# Patient Record
Sex: MALE | Race: AMERICAN INDIAN OR ALASKA NATIVE | NOT HISPANIC OR LATINO | Employment: FULL TIME | URBAN - METROPOLITAN AREA
[De-identification: names, ages, dates, MRNs, and addresses within clinical notes are randomized per-mention and may not be internally consistent; named-entity substitution may affect disease eponyms.]

---

## 2023-06-12 ENCOUNTER — HOSPITAL ENCOUNTER (OUTPATIENT)
Dept: DATA CONVERSION | Facility: HOSPITAL | Age: 33
End: 2023-06-12
Attending: STUDENT IN AN ORGANIZED HEALTH CARE EDUCATION/TRAINING PROGRAM | Admitting: STUDENT IN AN ORGANIZED HEALTH CARE EDUCATION/TRAINING PROGRAM

## 2023-06-12 DIAGNOSIS — S86.012A STRAIN OF LEFT ACHILLES TENDON, INITIAL ENCOUNTER: ICD-10-CM

## 2023-06-12 DIAGNOSIS — Z79.82 LONG TERM (CURRENT) USE OF ASPIRIN: ICD-10-CM

## 2023-09-07 VITALS — WEIGHT: 141.31 LBS | HEIGHT: 61 IN | BODY MASS INDEX: 26.68 KG/M2

## 2023-09-30 NOTE — H&P
History & Physical Reviewed:   I have reviewed the History and Physical dated:  08-Jun-2023   History and Physical reviewed and relevant findings noted. Patient examined to review pertinent physical  findings.: No significant changes   Home Medications Reviewed: no changes noted   Allergies Reviewed: no changes noted       ERAS (Enhanced Recovery After Surgery):  ·  ERAS Patient: no     Consent:   COVID-19 Consent:  ·  COVID-19 Risk Consent Surgeon has reviewed key risks related to the risk of magdiel COVID-19 and if they contract COVID-19 what the risks are.       Electronic Signatures:  Arian Mackay)  (Signed 12-Jun-2023 07:06)   Authored: History & Physical Reviewed, ERAS, Consent,  Note Completion      Last Updated: 12-Jun-2023 07:06 by Arian Mackay)

## 2023-10-01 PROBLEM — R26.2 DIFFICULTY WALKING: Status: ACTIVE | Noted: 2023-10-01

## 2023-10-01 PROBLEM — F32.A ANXIETY AND DEPRESSION: Status: ACTIVE | Noted: 2023-10-01

## 2023-10-01 PROBLEM — F41.9 ANXIETY AND DEPRESSION: Status: ACTIVE | Noted: 2023-10-01

## 2023-10-01 PROBLEM — S86.012A RUPTURE OF LEFT ACHILLES TENDON: Status: ACTIVE | Noted: 2023-10-01

## 2023-10-01 PROBLEM — S86.012D ACHILLES RUPTURE, LEFT, SUBSEQUENT ENCOUNTER: Status: ACTIVE | Noted: 2023-10-01

## 2023-10-01 PROBLEM — J45.909 ASTHMA (HHS-HCC): Status: ACTIVE | Noted: 2023-10-01

## 2023-10-01 PROBLEM — M25.672 STIFFNESS OF LEFT ANKLE, NOT ELSEWHERE CLASSIFIED: Status: ACTIVE | Noted: 2023-10-01

## 2023-10-01 RX ORDER — FLUTICASONE PROPIONATE 110 UG/1
1 AEROSOL, METERED RESPIRATORY (INHALATION) 2 TIMES DAILY
COMMUNITY
Start: 2022-09-28

## 2023-10-01 RX ORDER — ESCITALOPRAM OXALATE 10 MG/1
1 TABLET ORAL DAILY
COMMUNITY
Start: 2022-09-28

## 2023-10-01 RX ORDER — ACETAMINOPHEN 325 MG/1
1-2 TABLET ORAL EVERY 6 HOURS PRN
COMMUNITY
Start: 2023-07-12

## 2023-10-01 RX ORDER — TRAMADOL HYDROCHLORIDE 50 MG/1
1 TABLET ORAL AS NEEDED
COMMUNITY
Start: 2023-05-31

## 2023-10-01 RX ORDER — ALBUTEROL SULFATE 90 UG/1
1-2 AEROSOL, METERED RESPIRATORY (INHALATION) AS NEEDED
COMMUNITY
Start: 2022-09-28

## 2023-10-01 RX ORDER — CHLORHEXIDINE GLUCONATE ORAL RINSE 1.2 MG/ML
15 SOLUTION DENTAL DAILY
COMMUNITY
Start: 2023-06-08

## 2023-10-01 RX ORDER — ASPIRIN 325 MG
325 TABLET ORAL DAILY
COMMUNITY
Start: 2023-05-31

## 2023-10-01 RX ORDER — FAMOTIDINE 20 MG/1
1 TABLET, FILM COATED ORAL DAILY
COMMUNITY
Start: 2023-05-31

## 2023-10-01 RX ORDER — OXYCODONE HYDROCHLORIDE 5 MG/1
1 TABLET ORAL AS NEEDED
COMMUNITY
Start: 2023-06-16

## 2023-10-02 NOTE — OP NOTE
Post Operative Note:     PreOp Diagnosis: Left Achilles Tendon Rupture   Post-Procedure Diagnosis: Left Achilles Tendon Rupture   Procedure: Left Achilles Tendon Repair   Surgeon: Dr. Mackay   Resident/Fellow/Other Assistant:    Anesthesia: General + Regional Block   I.V. Fluids: 1000 mL   Estimated Blood Loss (mL): 5 cc   Specimen: no   Complications: None apparent   Findings: Complete rupture of achilles tendon, consistent  with diagnosis   Patient Returned To/Condition: PACU/stable   Drains and/or Catheters: None   Tourniquet Times: 73 minutes   Additional Details: NWB LLE in SLS  ADAT to regular diet  DVT PPX: SCDs, early mobilization and 325 ASA PO BID with meals for 6 weeks  Discharge home when PACU criterion met     Operative Report Dictated:  Dictation: no       Electronic Signatures:  Arian Mackay)  (Signed 12-Jun-2023 09:51)   Authored: Post Operative Note, Note Completion      Last Updated: 12-Jun-2023 09:51 by Arian Mackay)

## 2023-10-03 ENCOUNTER — OFFICE VISIT (OUTPATIENT)
Dept: ORTHOPEDIC SURGERY | Facility: HOSPITAL | Age: 33
End: 2023-10-03
Payer: MEDICAID

## 2023-10-03 DIAGNOSIS — M76.62 ACHILLES TENDINITIS OF LEFT LOWER EXTREMITY: ICD-10-CM

## 2023-10-03 DIAGNOSIS — Z98.890 S/P ACHILLES TENDON REPAIR: Primary | ICD-10-CM

## 2023-10-03 PROCEDURE — 99212 OFFICE O/P EST SF 10 MIN: CPT | Performed by: STUDENT IN AN ORGANIZED HEALTH CARE EDUCATION/TRAINING PROGRAM

## 2023-10-03 RX ORDER — MELOXICAM 7.5 MG/1
7.5 TABLET ORAL 2 TIMES DAILY PRN
Qty: 28 TABLET | Refills: 0 | Status: SHIPPED | OUTPATIENT
Start: 2023-10-03 | End: 2023-10-17

## 2023-10-03 ASSESSMENT — PAIN - FUNCTIONAL ASSESSMENT: PAIN_FUNCTIONAL_ASSESSMENT: NO/DENIES PAIN

## 2023-10-03 NOTE — PROGRESS NOTES
ORTHOPAEDIC SURGERY PROGRESS NOTE    Chief Complaint: Left Achilles Pain    History Of Present Illness  Brief clinical history as the [facility] EMR notes have not yet been imported into epic.    32-year-old male who sustained an injury to his left ankle while playing basketball from 05/27/2023.  An MRI of his left ankle without contrast was obtained from 05/30/2023 that demonstrated a complete Achilles tendon rupture.  He underwent an uncomplicated left Achilles tendon repair with percutaneous technique from 05/27/2023.  He has been recovering well and continues in physical therapy.  He has returned to work full-time.    10/03/2023: Patient returns for follow-up s/p left Achilles tendon repair from 05/27/2023.  He has been progressing well in physical therapy.  Patient notes that approximately 3 days ago, his daughter ran into the back of his ankle with a shopping cart while at the store.  Patient experienced immediate pain, rated as mild to moderate in severity.  He has been able to continue working but was concerned that he had developed a new injury to his Achilles.  He has been taking anti-inflammatories without relief.  He denies any associated numbness, tingling or weakness.     Past Medical History  He has a past medical history of Personal history of urinary calculi.    Surgical History  He has a past surgical history that includes Other surgical history (09/28/2022).     Social History  He has no history on file for tobacco use, alcohol use, and drug use.    Family History  Family History   Problem Relation Name Age of Onset    Hypertension Mother      Other (ESRD) Maternal Grandmother          Allergies  Patient has no known allergies.    Review of Systems  REVIEW OF SYSTEMS  Constitutional: no unplanned weight loss  Psychiatric: no suicidal ideation  ENT: no vision changes, no sinus problems  Pulmonary: no shortness of breath during office visit today  Lymphatic: no enlarged lymph nodes  Cardiovascular: no  chest pain or shortness of breath during office visit today  Gastrointestinal: no stomach problems  Genitourinary: no dysuria   Skin: no rashes  Endocrine: no thyroid problems  Neurological: no headache, no numbness  Hematological: no easy bruising  Musculoskeletal: Left Achilles pain    Physical Exam  PHYSICAL EXAMINATION  Constitutional Exam: well developed and well nourished  Psychiatric Exam: alert and oriented, appropriate mood and behavior  Eye Exam: EOMI  Pulmonary Exam: breathing non-labored, no apparent distress  Lymphatic exam: no appreciable lymphadenopathy in the lower extremities  Cardiovascular exam: RRR to peripheral palpation, DP pulses 2+, PT 2+, toes are pink with good capillary refill, no pitting edema  Skin exam: no open lesions, rashes, abrasions or ulcerations  Neurological exam: sensation to light touch intact in both lower extremities in peripheral and dermatomal distributions (except for any abnormalities noted in musculoskeletal exam)    Musculoskeletal exam: Left lower extremity examination.  Patient pain localized to the posterior Achilles.  There is no obvious edema.  Midline Achilles incision well-healed, hypertrophic scar noted.  There is no palpable defect in the Achilles tendon or nodularity noted.  Patient has pain-free ankle, subtalar and midtarsal joint range of motion.  Patient has sensation intact light touch grossly to saphenous, sural, superficial peroneal, deep peroneal and tibial nerve distribution.  He has 5 out of 5 strength in plantarflexion, dorsiflexion and EHL.  He has 2+ DP/PT pulse palpated.  Prone Garcia's examination demonstrates the Achilles tendon is intact.    Last Recorded Vitals  There were no vitals taken for this visit.    Laboratory Results    No results found for this or any previous visit (from the past 24 hour(s)).    Radiology Results   No new imaging at this visit.      Assessment/Plan   33-year-old male s/p L Achilles tendon repair with percutaneous  technique from 05/27/2023 with recent direct trauma to the Achilles, clinical examination not concerning for repeat rupture.  I have reviewed the diagnosis and treatment options extensively with the patient.  In my impression, the patient may continue weightbearing as tolerated in his left lower extremity.  I will provide him a prescription for 7.5 mg meloxicam twice daily with meals as needed for pain.  He will continue with physical therapy, I have no restrictions for him at this time.  I will plan to see the patient back in approximately 2 months for a repeat clinical evaluation.  Upon return, patient would not require further imaging.    Arian Mackay MD, DELORES  Department of Orthopaedic Surgery  [facility]

## 2023-10-03 NOTE — LETTER
October 3, 2023     Prema Goss MD  [address]  [address]  [facility] [address]    Patient: Mathew Ulrich   YOB: 1990   Date of Visit: 10/3/2023       Dear Dr. Prema Goss MD:    Thank you for referring Mathew Ulrich to me for evaluation. Below are my notes for this consultation.  If you have questions, please do not hesitate to call me. I look forward to following your patient along with you.       Sincerely,     Arian Mackay MD      CC: No Recipients  ______________________________________________________________________________________    ORTHOPAEDIC SURGERY PROGRESS NOTE    Chief Complaint: Left Achilles Pain    History Of Present Illness  Brief clinical history as the [facility] EMR notes have not yet been imported into epic.    32-year-old male who sustained an injury to his left ankle while playing basketball from 05/27/2023.  An MRI of his left ankle without contrast was obtained from 05/30/2023 that demonstrated a complete Achilles tendon rupture.  He underwent an uncomplicated left Achilles tendon repair with percutaneous technique from 05/27/2023.  He has been recovering well and continues in physical therapy.  He has returned to work full-time.    10/03/2023: Patient returns for follow-up s/p left Achilles tendon repair from 05/27/2023.  He has been progressing well in physical therapy.  Patient notes that approximately 3 days ago, his daughter ran into the back of his ankle with a shopping cart while at the store.  Patient experienced immediate pain, rated as mild to moderate in severity.  He has been able to continue working but was concerned that he had developed a new injury to his Achilles.  He has been taking anti-inflammatories without relief.  He denies any associated numbness, tingling or weakness.     Past Medical History  He has a past medical history of Personal history of urinary calculi.    Surgical History  He has a past surgical history that  includes Other surgical history (09/28/2022).     Social History  He has no history on file for tobacco use, alcohol use, and drug use.    Family History  Family History   Problem Relation Name Age of Onset   • Hypertension Mother     • Other (ESRD) Maternal Grandmother          Allergies  Patient has no known allergies.    Review of Systems  REVIEW OF SYSTEMS  Constitutional: no unplanned weight loss  Psychiatric: no suicidal ideation  ENT: no vision changes, no sinus problems  Pulmonary: no shortness of breath during office visit today  Lymphatic: no enlarged lymph nodes  Cardiovascular: no chest pain or shortness of breath during office visit today  Gastrointestinal: no stomach problems  Genitourinary: no dysuria   Skin: no rashes  Endocrine: no thyroid problems  Neurological: no headache, no numbness  Hematological: no easy bruising  Musculoskeletal: Left Achilles pain    Physical Exam  PHYSICAL EXAMINATION  Constitutional Exam: well developed and well nourished  Psychiatric Exam: alert and oriented, appropriate mood and behavior  Eye Exam: EOMI  Pulmonary Exam: breathing non-labored, no apparent distress  Lymphatic exam: no appreciable lymphadenopathy in the lower extremities  Cardiovascular exam: RRR to peripheral palpation, DP pulses 2+, PT 2+, toes are pink with good capillary refill, no pitting edema  Skin exam: no open lesions, rashes, abrasions or ulcerations  Neurological exam: sensation to light touch intact in both lower extremities in peripheral and dermatomal distributions (except for any abnormalities noted in musculoskeletal exam)    Musculoskeletal exam: Left lower extremity examination.  Patient pain localized to the posterior Achilles.  There is no obvious edema.  Midline Achilles incision well-healed, hypertrophic scar noted.  There is no palpable defect in the Achilles tendon or nodularity noted.  Patient has pain-free ankle, subtalar and midtarsal joint range of motion.  Patient has sensation  intact light touch grossly to saphenous, sural, superficial peroneal, deep peroneal and tibial nerve distribution.  He has 5 out of 5 strength in plantarflexion, dorsiflexion and EHL.  He has 2+ DP/PT pulse palpated.  Prone Garcia's examination demonstrates the Achilles tendon is intact.    Last Recorded Vitals  There were no vitals taken for this visit.    Laboratory Results    No results found for this or any previous visit (from the past 24 hour(s)).    Radiology Results   No new imaging at this visit.      Assessment/Plan  33-year-old male s/p L Achilles tendon repair with percutaneous technique from 05/27/2023 with recent direct trauma to the Achilles, clinical examination not concerning for repeat rupture.  I have reviewed the diagnosis and treatment options extensively with the patient.  In my impression, the patient may continue weightbearing as tolerated in his left lower extremity.  I will provide him a prescription for 7.5 mg meloxicam twice daily with meals as needed for pain.  He will continue with physical therapy, I have no restrictions for him at this time.  I will plan to see the patient back in approximately 2 months for a repeat clinical evaluation.  Upon return, patient would not require further imaging.    Arian Mackay MD, DELORES  Department of Orthopaedic Surgery  [facility]

## 2023-10-04 ENCOUNTER — TREATMENT (OUTPATIENT)
Dept: PHYSICAL THERAPY | Facility: HOSPITAL | Age: 33
End: 2023-10-04
Payer: MEDICAID

## 2023-10-04 DIAGNOSIS — R26.2 DIFFICULTY WALKING: ICD-10-CM

## 2023-10-04 DIAGNOSIS — S86.012D ACHILLES RUPTURE, LEFT, SUBSEQUENT ENCOUNTER: Primary | ICD-10-CM

## 2023-10-04 DIAGNOSIS — M25.572 LEFT ANKLE PAIN: ICD-10-CM

## 2023-10-04 DIAGNOSIS — M25.672 STIFFNESS OF LEFT ANKLE, NOT ELSEWHERE CLASSIFIED: ICD-10-CM

## 2023-10-04 PROCEDURE — 97110 THERAPEUTIC EXERCISES: CPT | Mod: GP | Performed by: PHYSICAL THERAPIST

## 2023-10-04 PROCEDURE — 97116 GAIT TRAINING THERAPY: CPT | Mod: GP | Performed by: PHYSICAL THERAPIST

## 2023-10-04 PROCEDURE — 97140 MANUAL THERAPY 1/> REGIONS: CPT | Mod: GP | Performed by: PHYSICAL THERAPIST

## 2023-10-04 ASSESSMENT — PAIN - FUNCTIONAL ASSESSMENT: PAIN_FUNCTIONAL_ASSESSMENT: 0-10

## 2023-10-04 ASSESSMENT — ENCOUNTER SYMPTOMS
DEPRESSION: 0
OCCASIONAL FEELINGS OF UNSTEADINESS: 0
LOSS OF SENSATION IN FEET: 0

## 2023-10-04 ASSESSMENT — PAIN SCALES - GENERAL: PAINLEVEL_OUTOF10: 0 - NO PAIN

## 2023-10-04 NOTE — PROGRESS NOTES
"  Physical Therapy  Physical Therapy Treatment Note    Patient Name: Mathew Ulrich  MRN: [id number]  Today's Date: 10/4/2023       Insurance:  Visit number: 18 of 30  Authorization info: auth after 30 visits  Insurance Type: [facility] [facility] MED[employer]    General:  Reason for visit: s/p achilles repair  Referred by: Dr. Mackay    Assessment:  Patient tolerated today's session w/ expected muscle soreness and fatigue demonstrates improvements in LE strength displayed through performance of there-ex w/ increased resistance and difficulty w/ gait. Patient will benefit from ongoing PT services to progress gait training and strengthening as tolerated per post-op protocol to reach established goals and return to PLOF.       Plan:   OP PT Plan  Treatment/Interventions: Blood flow restriction therapy, Manual therapy, Neuromuscular re-education, Gait training, Therapeutic activities, Therapeutic exercises  PT Plan: Skilled PT (Progress gait and strengthening as tolerated)  PT Frequency: 2 times per week  Duration: 10 weeks    Current Problem  1. Achilles rupture, left, subsequent encounter        2. Stiffness of left ankle, not elsewhere classified        3. Left ankle pain        4. Difficulty walking            Precautions   Post-op achilles protocol      Subjective:   Subjective   Patient reports his follow up appointment w/ MD went well and he is ok to continue progressing as tolerated. Has been experiencing some \"soreness and tightness\" since his daughter hit his ankle and has been walking     Pain  Pain Assessment: 0-10  Pain Score: 0 - No pain    Objective:   Objective   Ankle AROM  Dorsiflexion R 20 L 20  Plantarflexion R 40 L 40    Stiffness and tenderness to palpation over achilles insertion, decreased achilles motility over lateral aspect.    Treatment Performed:  Therapeutic Exercise  Therapeutic Exercise Performed: Yes  Therapeutic Exercise Activity 1: BFR , WP 80%  Therapeutic Exercise Activity 2: Total gym " DL heel raises 30 ,15, 15, 15 w/ 2 band resistance  Therapeutic Exercise Activity 3: Squats w/ toes elevated and 26lb KB 30, 15, 15, 15  Therapeutic Exercise Activity 4: Fwd lunges 30 ,15, 15, 15    Manual Therapy  Manual Therapy Performed: Yes  Manual Therapy Activity 1: STM gastroc soleus  Manual Therapy Activity 2: STM achilles, achilles motility lifts and mobs    Ambulation/Gait Training  Ambulation/Gait Training Performed: Yes  Ambulation/Gait Training 1  Gait Support Devices: Other (Comment) (Alter-G treadmill)  Comments/Distance (ft) 1: Increased stance time RLE, decreased step length LLE  2 minutes at 70% BW, 4 mins at 80% BW, 4 mins at 90% BW speed 2.5 mph  Includes time for set up      Vincenzo Bonner, S-PT

## 2023-10-11 ENCOUNTER — APPOINTMENT (OUTPATIENT)
Dept: PHYSICAL THERAPY | Facility: HOSPITAL | Age: 33
End: 2023-10-11
Payer: MEDICAID

## 2023-10-18 ENCOUNTER — TREATMENT (OUTPATIENT)
Dept: PHYSICAL THERAPY | Facility: HOSPITAL | Age: 33
End: 2023-10-18
Payer: MEDICAID

## 2023-10-18 DIAGNOSIS — M25.572 CHRONIC PAIN OF LEFT ANKLE: ICD-10-CM

## 2023-10-18 DIAGNOSIS — S86.012D RUPTURE OF LEFT ACHILLES TENDON, SUBSEQUENT ENCOUNTER: Primary | ICD-10-CM

## 2023-10-18 DIAGNOSIS — G89.29 CHRONIC PAIN OF LEFT ANKLE: ICD-10-CM

## 2023-10-18 DIAGNOSIS — M25.672 STIFFNESS OF LEFT ANKLE, NOT ELSEWHERE CLASSIFIED: ICD-10-CM

## 2023-10-18 PROCEDURE — 97110 THERAPEUTIC EXERCISES: CPT | Mod: GP | Performed by: PHYSICAL THERAPIST

## 2023-10-18 PROCEDURE — 97016 VASOPNEUMATIC DEVICE THERAPY: CPT | Mod: GP | Performed by: PHYSICAL THERAPIST

## 2023-10-18 PROCEDURE — 97140 MANUAL THERAPY 1/> REGIONS: CPT | Mod: GP | Performed by: PHYSICAL THERAPIST

## 2023-10-18 PROCEDURE — 97116 GAIT TRAINING THERAPY: CPT | Mod: GP | Performed by: PHYSICAL THERAPIST

## 2023-10-18 ASSESSMENT — PAIN - FUNCTIONAL ASSESSMENT: PAIN_FUNCTIONAL_ASSESSMENT: 0-10

## 2023-10-18 ASSESSMENT — PAIN SCALES - GENERAL: PAINLEVEL_OUTOF10: 0 - NO PAIN

## 2023-10-18 NOTE — PROGRESS NOTES
"  Physical Therapy  Physical Therapy Treatment Note    Patient Name: Mathew Ulrich  MRN: [id number]  Today's Date: 10/18/2023  Time Calculation  Start Time: 1315  Stop Time: 1400  Time Calculation (min): 45 min    Insurance:  Visit number: 19 of 30  Authorization info: auth after 30 visits  Insurance Type: [employer] MED[employer]    General:  Reason for visit: s/p achilles repair  Referred by: Dr. Mackay    Assessment:  Patient tolerated today's session w/ no issues. Demonstrates improvements in activity tolerance, gait, and achilles mobility. Patient will benefit from ongoing PT services to progress plyometric activities and running as tolerated.         Plan:    Progress plyos, running as tolerated    Current Problem  1. Rupture of left Achilles tendon, subsequent encounter        2. Stiffness of left ankle, not elsewhere classified        3. Chronic pain of left ankle              Precautions   Post-op achilles protocol      Subjective:   Patient reports he has been feeling great the past few days. Recently started using a topical cream prescribed by his PCP and he feels it has made a significant difference in his sensations of stiffness and tightness w/ ADLs. Continues to be compliant w/ HEP and is not having any issues.     Pain  Pain Assessment: 0-10  Pain Score: 0 - No pain    Objective:   Objective   Ankle AROM  Dorsiflexion R 20 L 20  Plantarflexion R 40 L 40    No tenderness to palpation over achilles tendon, motility has improved since last visit. Mild stiffness over medial aspect of achilles.       Performing HEP?: Yes        Treatment Performed:    Therapeutic Exercise:    15 min  SL total gym squats w/ 2 band resistance 4x8  Box jumps to 6\" box 3x10 w/ verbal cues to absorb force and flex knees  Depth jumps from 6\" box 2x10 w/ verbal cues to absorb force and flex knees   Educated patient on progression, tissue healing and return to basketball activities    Manual Therapy:    15 min  Achilles " motility  STM to gastroc/soleus w/ theragun     Other: Gait training   10 min  Walk to run progression starting at 75% BW and 3.0 mph  Progressed to jogging at 5.2 mph after 2 minutes  Increased bodyweight by 5% each minute until 100% reached  1 minute total running time at 100% BW and 5.2 mph  No adverse reactions reported     Ice x 5 minutes     Vincenzo Bonner, S-PT

## 2023-10-25 ENCOUNTER — TREATMENT (OUTPATIENT)
Dept: PHYSICAL THERAPY | Facility: HOSPITAL | Age: 33
End: 2023-10-25
Payer: MEDICAID

## 2023-10-25 DIAGNOSIS — G89.29 CHRONIC PAIN OF LEFT ANKLE: Primary | ICD-10-CM

## 2023-10-25 DIAGNOSIS — M25.572 CHRONIC PAIN OF LEFT ANKLE: Primary | ICD-10-CM

## 2023-10-25 DIAGNOSIS — M25.672 STIFFNESS OF LEFT ANKLE, NOT ELSEWHERE CLASSIFIED: ICD-10-CM

## 2023-10-25 DIAGNOSIS — S86.012D RUPTURE OF LEFT ACHILLES TENDON, SUBSEQUENT ENCOUNTER: ICD-10-CM

## 2023-10-25 PROCEDURE — 97116 GAIT TRAINING THERAPY: CPT | Mod: GP | Performed by: PHYSICAL THERAPIST

## 2023-10-25 PROCEDURE — 97110 THERAPEUTIC EXERCISES: CPT | Mod: GP | Performed by: PHYSICAL THERAPIST

## 2023-10-25 PROCEDURE — 97016 VASOPNEUMATIC DEVICE THERAPY: CPT | Mod: GP | Performed by: PHYSICAL THERAPIST

## 2023-10-25 PROCEDURE — 97140 MANUAL THERAPY 1/> REGIONS: CPT | Mod: GP | Performed by: PHYSICAL THERAPIST

## 2023-10-25 ASSESSMENT — PAIN - FUNCTIONAL ASSESSMENT: PAIN_FUNCTIONAL_ASSESSMENT: 0-10

## 2023-10-25 ASSESSMENT — PAIN SCALES - GENERAL: PAINLEVEL_OUTOF10: 0 - NO PAIN

## 2023-11-01 ENCOUNTER — APPOINTMENT (OUTPATIENT)
Dept: PHYSICAL THERAPY | Facility: HOSPITAL | Age: 33
End: 2023-11-01
Payer: MEDICAID

## 2023-11-02 ENCOUNTER — APPOINTMENT (OUTPATIENT)
Dept: PHYSICAL THERAPY | Facility: HOSPITAL | Age: 33
End: 2023-11-02
Payer: MEDICAID

## 2023-11-10 ENCOUNTER — TREATMENT (OUTPATIENT)
Dept: PHYSICAL THERAPY | Facility: HOSPITAL | Age: 33
End: 2023-11-10
Payer: MEDICAID

## 2023-11-10 DIAGNOSIS — M25.572 ACUTE LEFT ANKLE PAIN: ICD-10-CM

## 2023-11-10 DIAGNOSIS — M25.572 CHRONIC PAIN OF LEFT ANKLE: Primary | ICD-10-CM

## 2023-11-10 DIAGNOSIS — M25.672 STIFFNESS OF LEFT ANKLE, NOT ELSEWHERE CLASSIFIED: ICD-10-CM

## 2023-11-10 DIAGNOSIS — G89.29 CHRONIC PAIN OF LEFT ANKLE: Primary | ICD-10-CM

## 2023-11-10 DIAGNOSIS — S86.012D RUPTURE OF LEFT ACHILLES TENDON, SUBSEQUENT ENCOUNTER: ICD-10-CM

## 2023-11-10 DIAGNOSIS — R26.2 DIFFICULTY WALKING: ICD-10-CM

## 2023-11-10 DIAGNOSIS — S86.012D ACHILLES RUPTURE, LEFT, SUBSEQUENT ENCOUNTER: ICD-10-CM

## 2023-11-10 DIAGNOSIS — M25.572 LEFT ANKLE PAIN: ICD-10-CM

## 2023-11-10 PROCEDURE — 97016 VASOPNEUMATIC DEVICE THERAPY: CPT | Mod: GP | Performed by: PHYSICAL THERAPIST

## 2023-11-10 PROCEDURE — 97116 GAIT TRAINING THERAPY: CPT | Mod: GP | Performed by: PHYSICAL THERAPIST

## 2023-11-10 PROCEDURE — 97112 NEUROMUSCULAR REEDUCATION: CPT | Mod: GP | Performed by: PHYSICAL THERAPIST

## 2023-11-10 PROCEDURE — 97110 THERAPEUTIC EXERCISES: CPT | Mod: GP | Performed by: PHYSICAL THERAPIST

## 2023-11-10 ASSESSMENT — PAIN SCALES - GENERAL: PAINLEVEL_OUTOF10: 0 - NO PAIN

## 2023-11-10 ASSESSMENT — PAIN - FUNCTIONAL ASSESSMENT: PAIN_FUNCTIONAL_ASSESSMENT: 0-10

## 2023-11-10 NOTE — PROGRESS NOTES
"  Physical Therapy  Physical Therapy Treatment Note    Patient Name: Mathew Ulrich  MRN: [id number]  Today's Date: 11/10/2023  Time Calculation  Start Time: 0830  Stop Time: 0950  Time Calculation (min): 80 min    Insurance:  Visit number: 21 of 30  Authorization info: auth after 30 visits  Insurance Type: [employer] [facility]    General:  Reason for visit: s/p achilles repair  Referred by: Dr. Mackay    Assessment:  Pt had best session to date. Pt denied increase in pain with pulsometrics. Pt continues to have functional weakness with jumping with dynamic knee valgus. Pt would continue to benefit from further therapy to reach all established goals         Plan:    Continue tibialis anterior strengthening. Progress gait and plyos as tolerated. STM prn.     Current Problem  1. Chronic pain of left ankle        2. Rupture of left Achilles tendon, subsequent encounter        3. Stiffness of left ankle, not elsewhere classified        4. Achilles rupture, left, subsequent encounter        5. Left ankle pain        6. Difficulty walking        7. Acute left ankle pain                Precautions   Post-op achilles protocol      Subjective:   Pt arrived to therapy reporting he is feeling good overall. Pt states he would like to work on jogging and plyometrics today      Pain  Pain Assessment: 0-10  Pain Score: 0 - No pain    Objective:   Objective   Ankle AROM  Dorsiflexion R 20 L 20  Plantarflexion R 40 L 40    No tenderness to palpation over achilles tendon. Palpable trigger point in lateral gastroc muscle belly      Performing HEP?: Yes        Treatment Performed:    Neuromuscular Re Education:    45min    Fwd single leg hops 3x20  Lateral hoping vs sports cord 3x12  Box Jumps 6\" box 3x10      Other: Gait training   20min  Walk to run progression  Heel to toe walking x10 yards x3 rounds to warm up  Started at 3.0mph, progressed to 5.2mph, decreased to 4.5 x10'  Therapeutic Exercise:    10 min  Upright bike warm up " x 5'  Sled pushes 10 yards x8      VASO COLD mod compression x10'    Ori Shin, PT

## 2023-12-08 ENCOUNTER — TREATMENT (OUTPATIENT)
Dept: PHYSICAL THERAPY | Facility: HOSPITAL | Age: 33
End: 2023-12-08
Payer: MEDICAID

## 2023-12-08 DIAGNOSIS — M25.572 ACUTE LEFT ANKLE PAIN: ICD-10-CM

## 2023-12-08 DIAGNOSIS — S86.012D ACHILLES RUPTURE, LEFT, SUBSEQUENT ENCOUNTER: ICD-10-CM

## 2023-12-08 DIAGNOSIS — M25.572 CHRONIC PAIN OF LEFT ANKLE: Primary | ICD-10-CM

## 2023-12-08 DIAGNOSIS — M25.572 LEFT ANKLE PAIN: ICD-10-CM

## 2023-12-08 DIAGNOSIS — S86.012D RUPTURE OF LEFT ACHILLES TENDON, SUBSEQUENT ENCOUNTER: ICD-10-CM

## 2023-12-08 DIAGNOSIS — M25.672 STIFFNESS OF LEFT ANKLE, NOT ELSEWHERE CLASSIFIED: ICD-10-CM

## 2023-12-08 DIAGNOSIS — G89.29 CHRONIC PAIN OF LEFT ANKLE: Primary | ICD-10-CM

## 2023-12-08 DIAGNOSIS — R26.2 DIFFICULTY WALKING: ICD-10-CM

## 2023-12-08 PROCEDURE — 97112 NEUROMUSCULAR REEDUCATION: CPT | Mod: GP | Performed by: PHYSICAL THERAPIST

## 2023-12-08 PROCEDURE — 97110 THERAPEUTIC EXERCISES: CPT | Mod: GP | Performed by: PHYSICAL THERAPIST

## 2023-12-08 ASSESSMENT — PAIN - FUNCTIONAL ASSESSMENT: PAIN_FUNCTIONAL_ASSESSMENT: 0-10

## 2023-12-08 ASSESSMENT — PAIN SCALES - GENERAL: PAINLEVEL_OUTOF10: 0 - NO PAIN

## 2023-12-15 ENCOUNTER — APPOINTMENT (OUTPATIENT)
Dept: PHYSICAL THERAPY | Facility: HOSPITAL | Age: 33
End: 2023-12-15
Payer: MEDICAID

## 2023-12-22 ENCOUNTER — TREATMENT (OUTPATIENT)
Dept: PHYSICAL THERAPY | Facility: HOSPITAL | Age: 33
End: 2023-12-22
Payer: MEDICAID

## 2023-12-22 DIAGNOSIS — M25.572 CHRONIC PAIN OF LEFT ANKLE: Primary | ICD-10-CM

## 2023-12-22 DIAGNOSIS — R26.2 DIFFICULTY WALKING: ICD-10-CM

## 2023-12-22 DIAGNOSIS — M25.672 STIFFNESS OF LEFT ANKLE, NOT ELSEWHERE CLASSIFIED: ICD-10-CM

## 2023-12-22 DIAGNOSIS — S86.012D RUPTURE OF LEFT ACHILLES TENDON, SUBSEQUENT ENCOUNTER: ICD-10-CM

## 2023-12-22 DIAGNOSIS — G89.29 CHRONIC PAIN OF LEFT ANKLE: Primary | ICD-10-CM

## 2023-12-22 DIAGNOSIS — S86.012D ACHILLES RUPTURE, LEFT, SUBSEQUENT ENCOUNTER: ICD-10-CM

## 2023-12-22 PROCEDURE — 97110 THERAPEUTIC EXERCISES: CPT | Mod: GP | Performed by: PHYSICAL THERAPIST

## 2023-12-22 PROCEDURE — 97112 NEUROMUSCULAR REEDUCATION: CPT | Mod: GP | Performed by: PHYSICAL THERAPIST

## 2023-12-22 ASSESSMENT — PAIN SCALES - GENERAL: PAINLEVEL_OUTOF10: 0 - NO PAIN

## 2023-12-22 ASSESSMENT — PAIN - FUNCTIONAL ASSESSMENT: PAIN_FUNCTIONAL_ASSESSMENT: 0-10

## 2023-12-22 NOTE — PROGRESS NOTES
"  Physical Therapy  Physical Therapy Treatment Note    Patient Name: Mathew Ulrich  MRN: [id number]  Today's Date: 12/22/2023       Insurance:  Visit number: 23 of 30  Authorization info: auth after 30 visits  Insurance Type: [employer] MEDICAID    General:  Reason for visit: s/p achilles repair  Referred by: Dr. Mackay    Assessment:  Pt continues to progress well with plyometrics and sport specific activities. Pt to follow up one more time in Jan. For return to sport testing         Plan:    Continue tibialis anterior strengthening. Progress gait and plyos as tolerated. STM prn.     Current Problem  1. Chronic pain of left ankle        2. Rupture of left Achilles tendon, subsequent encounter        3. Stiffness of left ankle, not elsewhere classified        4. Achilles rupture, left, subsequent encounter        5. Difficulty walking                    Precautions   Post-op achilles protocol      Subjective:   Pt arrived to therapy reporting he is tired today but overall feeling much better. Pt reports compliance with HEP      Pain  Pain Assessment: 0-10  Pain Score: 0 - No pain    Objective:   Objective   Ankle AROM  Dorsiflexion R 20 L 20  Plantarflexion R 40 L 40    No tenderness to palpation over achilles tendon. Palpable trigger point in lateral gastroc muscle belly      Performing HEP?: Yes        Treatment Performed:    Neuromuscular Re Education:    25min    Fwd single leg hops 3x20  Band assisted squat jumps in squat rack 3x20  Box Jumps 12\" box 3x10  Step down from 12\" box with jump 3x10  Single leg ball toss vs rebounder 3x20      Therapeutic Exercise:    15min  Upright bike warm up x 5'  Gastroc and soleus stretch 3x30\" each  Heel raise eccentrics 3x10  Basketball shooting      Ori Shin PT   "

## 2024-01-12 ENCOUNTER — TREATMENT (OUTPATIENT)
Dept: PHYSICAL THERAPY | Facility: HOSPITAL | Age: 34
End: 2024-01-12
Payer: MEDICAID

## 2024-01-12 DIAGNOSIS — M25.672 STIFFNESS OF LEFT ANKLE, NOT ELSEWHERE CLASSIFIED: ICD-10-CM

## 2024-01-12 DIAGNOSIS — S86.012D RUPTURE OF LEFT ACHILLES TENDON, SUBSEQUENT ENCOUNTER: Primary | ICD-10-CM

## 2024-01-12 DIAGNOSIS — M25.572 ACUTE LEFT ANKLE PAIN: ICD-10-CM

## 2024-01-12 DIAGNOSIS — R26.2 DIFFICULTY WALKING: ICD-10-CM

## 2024-01-12 PROCEDURE — 97530 THERAPEUTIC ACTIVITIES: CPT | Mod: GP | Performed by: PHYSICAL THERAPIST

## 2024-01-12 PROCEDURE — 97110 THERAPEUTIC EXERCISES: CPT | Mod: GP | Performed by: PHYSICAL THERAPIST

## 2024-01-12 ASSESSMENT — PAIN - FUNCTIONAL ASSESSMENT: PAIN_FUNCTIONAL_ASSESSMENT: 0-10

## 2024-01-12 ASSESSMENT — PAIN SCALES - GENERAL: PAINLEVEL_OUTOF10: 0 - NO PAIN

## 2024-01-12 NOTE — PROGRESS NOTES
"  Physical Therapy  Physical Therapy Treatment Note    Patient Name: Mathew Ulrich  MRN: [id number]  Today's Date: 1/12/2024  Time Calculation  Start Time: 1015  Stop Time: 1115  Time Calculation (min): 60 min    Insurance:  Visit number: 24 of 30  Authorization info: auth after 30 visits  [facility]: [facility]    General:  Reason for visit: s/p achilles repair  Referred by: Dr. Mackay      Current Problem  1. Rupture of left Achilles tendon, subsequent encounter        2. Stiffness of left ankle, not elsewhere classified        3. Difficulty walking        4. Acute left ankle pain          Precautions   Post-op achilles protocol      Subjective:   Pt arrived to therapy reporting he is feeling good today except he is tired because he worked back to back 12 hr shifts. Pt thinks today can be his last visit    Pain  Pain Assessment: 0-10  Pain Score: 0 - No pain    Objective:   Objective   Ankle AROM  Dorsiflexion R 20 L 20  Plantarflexion R 40 L 40    No tenderness to palpation over achilles tendon. Palpable trigger point in lateral gastroc muscle belly      Performing HEP?: Yes        Treatment Performed:    Therapeutic Activity:    45min  Upright bike warm up x 5'  Standing lunge test  Right and left 8cm  6M Cross Hop Test  Right 3.35\" Left 3.37\"  Side hop test  Right 31 reps Left 27 reps  Y-Balance  Anterior R: 77cm L: 78 cm  Posterior lateral R: 112cm L 108cm  Posterior medial R: 98cm L:99cm    LEFS 79/80  Therapeutic Activity:    15min  Evaluating patient while he was not feeling well post session       Assessment:  Pt did great with ankle return to sport testing. While patient was filling out discharge paper work and ask to get air secondary to feeling light headed and SOB. We walked to the atrium and provide patient with water and juice (had not ate in 16 hrs). Vitals were checked O2 saturation 98% /85 HR 94. Pt did go to restroom and vomited. He stated he fekt significantly better. Pt " stated he felt fine to drive and did not need further assessment. Pt vitals were stable. I was able to speak to patient 2hrs after visit and he was at home feeling fine. Educated patient if he felt worse to go to ED. I believe this episode was secondary to lack of sleep and lack of eating and he he was very challenged today    Pt achieved all established goals discharge from PT         Plan:   Discharge from therapy  Ori Shin, PT

## 2024-04-19 NOTE — OP NOTE
Patient Name: Mathew Ulrich  MRN: [id number]  YOB: 1990  Date of Service: 06/12/2023    PREOPERATIVE DIAGNOSIS: Left Achilles Tendon Rupture    POSTOPERATIVE DIAGNOSIS: Left Achilles Tendon Rupture      OPERATION/PROCEDURE: Left Achilles Tendon Repair    SURGEON: Dr. Mackay    ASSISTANT(S):  Rosalina Peterson SA    ANESTHESIA:  GETA with regional block    ESTIMATED BLOOD LOSS: 5 cc    COMPLICATIONS: None apparent    DISPOSITION: PACU    BRIEF CLINICAL HISTORY: 32-year-old male who presented to me as a referral for evaluation of left ankle pain.  Patient was  playing basketball on 05/27/2023, attempted to run and then felt a pop in the back of his ankle.  He reported pain at that time as 11 out of 10.  Patient was seen by one of our sports medicine providers  on 05/30/2023 and there was concern clinically for Achilles tendon rupture.  Patient was made nonweightbearing in a boot with crutches.  An MRI was obtained that confirmed a complete rupture at which point the patient was referred to me and I performed  a physical exam that was significant for a palpable defect in left Achilles tendon as well as reviewed the relevant MRI imaging demonstrating complete rupture.    I had a long discussion with the patient regarding the risk, benefits and alternatives to left Achilles tendon repair, specifically utilizing a limited incision technique as compared to functional  rehabilitation.  I discussed that benefits of primary repair including increased plantarflexion power as compared to nonoperative treatment and a decreased risk of subsequent rerupture.  I reviewed  that this risk of rerupture is often lower than the postsurgical complication risks including infection, wound healing complications the potential for a sural nerve injury with percutaneous technique.  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    In brief, I  reviewed the risks, benefits and alternatives to left achilles tendon repair.  Risks include but are not limited to infection, wound healing complications, need for further surgery, persistent or worsening pain, postoperative stiffness, bleeding,  neurovascular injury, including sural nerve injury, failure of the procedure including risk of re-rupture, complications of anesthesia, stroke, DVT/PE , myocardial infarction and death. Again benefits of primary repair included increased plantarflexion power and decreased risk of rerupture, alternatives included functional rehabilitation. The patient voiced understanding of the risks, benefits and alternatives  and the informed consent was signed, with both myself and the patient present and surgery was scheduled.    OPERATIVE REPORT: On the day of surgery 06/12/2023 , the patient was met in the preoperative holding area by members of the orthopedic, anesthesia and nursing teams.  I marked the patient's left lower  extremity with indelible ink with the patient as my witness.  The informed consent was again reviewed.  All remaining questions were answered. In the preoperative holding area, the anesthesia team performed a  regional femoral/sciatic block, please see separate procedure note. The patient had been previously been seen by preadmission testing with  RCRI of 0.    The patient was then brought back to the operating room on [facility].  I led a preoperative timeout, verifying the correct patient, procedure and laterality procedure to be performed.  We confirmed the appropriate availability of all surgical equipment,   implants, and confirmed the administration of pre-surgical antibiotic prophylaxis within 1 hour of incision time with 1 gram ancef .  All present were in agreement.    Care was handed over to the anesthesia team who provided GETA.  The patient was  then transferred onto the operating room table in the prone position.  All bony prominences were  padded and an SCD  was placed on the contra-lateral extremity. A nonsterile, well-padded thigh tourniquet was placed.  The patient's left lower extremity was  then prepped and draped in usual sterile fashion with sterile prep with chlroprep.    I began by marking out a longitudinal incision centered over the palpable defect in the Achilles tendon.  An Esmarch was used to exsanguinate the extremity and the tourniquet  was inflated to 275 mmHg for 73 minutes.  A preincision pause and then conducted again verifying the correct patient, procedure and laterality procedure to be performed.  All present were in agreement.  Incision was then made with a #15 blade scalpel  through skin and subcutaneous tissue.  The peritenon was incised in line with the incision.  There was small hematoma and a complete rupture of the Achilles tendon was identified as consistent with the preoperative diagnosis and MRI imaging.  Using careful  retractor placement I protected the neurovascular bundle medially and identified and protected the sural nerve laterally throughout the remainder of the case.    Beginning first with the proximal tendon, 2 Allis clamps were placed and the tendon was mobilized circumferentially with careful blunt dissection to avoid disruptions of vascular supply and protect the peritenon.  I then turned my attention to the distal  stump and again with 2 Allis clamps to control the distal stump I circumferentially mobilized the tendon to the level of the calcaneus.      I then replaced the 2 Allis clamps onto the proximal stump and the Arthrex PARS jig was then introduced into the tendon sheath oriented in a proximal direction first, a series of 3 suture tapes and 2 loop sutures were passed in a percutaneous fashion  through skin and the jig in order to gain control the proximal stump.  The 2 loop sutures were then used to shuttle and lock one of the suture tapes.  There was excellent purchase and control of the proximal  stump.  Creep was removed by cycling the construct  10 times.    I then turned my attention to the distal stump and again with 2 Allis clamps to control the stump I introduced the Arthrex PARS jig into the distal sheath.  Again, a series of 3 suture tapes with 2 looped sutures were passed in a percutaneous fashion  through the skin and jig to gain control of the distal stump.  The looped sutures were used to shuttle and lock one of the suture tapes.  Creep was removed from by cycling the construct 10 times.    The foot and ankle was now placed into maximal plantarflexion and the sutures were tied and tails cut sequentially in order to obtain excellent approximation of the tendon edges.  The tendon edges were then oversewn.  There was excellent tendon apposition  and the repair was tested restoring the resting tension of the Achilles and intraoperative Garcia's test now indicated that the Achilles tendon was following repair. This was comparable to the contralateral side.    The wound was irrigated and I then closed the peritenon as a separate layer with 3-0 PDS.  At this time tourniquet was let down and there was excellent reperfusion noted with palpable DP and PT  pulses.  The wound was again irrigated and the subcuticular layer was closed with 3-0 Monocryl in a buried interrupted fashion with 3-0 nylon in a vertical mattress configuration for skin closure. At this time all surgical counts were noted to be correct.  The skin was cleansed and dried and dry sterile dressings with adaptic, 4x4s and webril .  The sterile drapes were removed.     The patient was then placed into a well-padded short leg splint with permissive plantar flexion. The patient was then re-positioned supine on the [facility] [place] and then awoken from anesthesia without complication brought back to the  PACU in stable condition.    Post-Operative Plan:   The patient will remain non-weight bearing in their left lower extremity in a short leg  splint  He will begin 325 ASA PO BID with meals for  DVT prophylaxis. He was prescribed oxycodone for severe post-operative pain after an appropriate OARRS report was reviewed demonstrating  no concerning findings for abuse.  I have encouraged early mobilization and extremity elevation as tolerated.  I will plan to see the patient back in approximately 10 days for their first postoperative  visit.    Arian Mackay MD, [place]  Department of Orthopaedic Surgery  [facility]    Note dictated with Solasta software.  Completed without full type editing and sent to avoid delay.     Electronic Signatures:  Arian Mackay)  (Signed on 12-Jun-2023 11:39)   Authored    Last Updated: 12-Jun-2023 11:39 by Arian Mackay)